# Patient Record
Sex: FEMALE | Race: WHITE | URBAN - NONMETROPOLITAN AREA
[De-identification: names, ages, dates, MRNs, and addresses within clinical notes are randomized per-mention and may not be internally consistent; named-entity substitution may affect disease eponyms.]

---

## 2019-05-31 ENCOUNTER — IMPORTED ENCOUNTER (OUTPATIENT)
Dept: URBAN - NONMETROPOLITAN AREA CLINIC 1 | Facility: CLINIC | Age: 49
End: 2019-05-31

## 2019-05-31 PROBLEM — H52.4: Noted: 2019-05-31

## 2019-05-31 PROBLEM — H52.222: Noted: 2019-05-31

## 2019-05-31 PROBLEM — H52.12: Noted: 2019-05-31

## 2019-05-31 PROBLEM — H52.13: Noted: 2020-12-07

## 2019-05-31 PROCEDURE — S0621 ROUTINE OPHTHALMOLOGICAL EXA: HCPCS

## 2019-05-31 NOTE — PATIENT DISCUSSION
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

## 2020-12-07 ENCOUNTER — IMPORTED ENCOUNTER (OUTPATIENT)
Dept: URBAN - NONMETROPOLITAN AREA CLINIC 1 | Facility: CLINIC | Age: 50
End: 2020-12-07

## 2020-12-07 PROCEDURE — S0621 ROUTINE OPHTHALMOLOGICAL EXA: HCPCS

## 2020-12-07 NOTE — PATIENT DISCUSSION
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

## 2022-04-09 ASSESSMENT — VISUAL ACUITY
OS_CC: 20/25-3
OD_CC: 20/20
OD_CC: 20/20BLURRY
OS_SC: 20/20
OU_CC: 20/20
OS_CC: 20/30-2
OU_SC: 20/30

## 2022-04-09 ASSESSMENT — TONOMETRY
OD_IOP_MMHG: 13
OS_IOP_MMHG: 13
OS_IOP_MMHG: 12
OD_IOP_MMHG: 13